# Patient Record
Sex: MALE | Race: OTHER
[De-identification: names, ages, dates, MRNs, and addresses within clinical notes are randomized per-mention and may not be internally consistent; named-entity substitution may affect disease eponyms.]

---

## 2019-03-11 ENCOUNTER — HOSPITAL ENCOUNTER (OUTPATIENT)
Dept: HOSPITAL 89 - RESP | Age: 24
End: 2019-03-11
Attending: FAMILY MEDICINE
Payer: COMMERCIAL

## 2019-03-11 DIAGNOSIS — R00.2: Primary | ICD-10-CM

## 2019-03-11 PROCEDURE — 93225 XTRNL ECG REC<48 HRS REC: CPT

## 2019-03-13 NOTE — RT HOLTER TEST
FACILITY: [facility] 

 

PATIENT NAME: ROMIE IRENE

: [id number]

MR: [id number]

V: [id number]

EXAM DATE: 

ORDERING PHYSICIAN: STEPHAN OWUSU

TECHNOLOGIST: BESSY

 

Hook-up date: 2019   09:21:00       Duration: 47:59:00

Test Indications: PALPITATIONS

Medications: N/A

              

              

              

              

              

              

              

181265 QRS complexes

     4 Ventricular      ectopics which represent    <1 % of total QRS comp.

   182 Supraventricular ectopics which represent    <1 % of total QRS comp.

     * Paced QRS complexes       which represent  **** % of total QRS comp.

VENTRICULAR ECTOPY

     4 Isolated

     0 Bigeminal Cycles

     0 Couplets

     0 Runs

     0 Beats in Runs

     * Beats LONGEST at   *  BPM at **:**:** ****-**-**

     * Beats FASTEST at   *  BPM at **:**:** ****-**-**

SUPRAVENTRICULAR ECTOPY

   110 Isolated

    36 Couplets

     0 Runs

     0 Beats in Runs

     * Beats LONGEST at   *  BPM at **:**:** ****-**-**

     * Beats FASTEST at   *  BPM at **:**:** ****-**-**

HEART RATES

    39 MIN at 07:16:40 2019

    72 AVG

   193 MAX at 20:17:04 2019

LONGEST RR

     1.704 secs at 06:11:21 2019

S-T LEVELS

     Channel 1

          -12.800 mm MIN at 09:21:00 2019.800 mm MAX at 09:21:00 2019

     Channel 2

          -12.800 mm MIN at 09:21:00 2019.800 mm MAX at 09:21:00 2019

     Channel 3

          -12.800 mm MIN at 09:21:00 2019.800 mm MAX at 09:21:00 2019

 

Occasional Premature ventricular complexes

Occasional Premature supraventricular complexes

Sinus bradycardia

 

Confirmed by STEPHAN JOLLY ([id number]) on 3/13/2019 12:57:37 PM

 

Referred By:             Overread By: STEPHAN JOLLY